# Patient Record
Sex: MALE | Race: WHITE | NOT HISPANIC OR LATINO | Employment: FULL TIME | URBAN - NONMETROPOLITAN AREA
[De-identification: names, ages, dates, MRNs, and addresses within clinical notes are randomized per-mention and may not be internally consistent; named-entity substitution may affect disease eponyms.]

---

## 2018-01-27 ENCOUNTER — HISTORICAL (OUTPATIENT)
Dept: ADMINISTRATIVE | Facility: HOSPITAL | Age: 48
End: 2018-01-27

## 2018-10-29 ENCOUNTER — HISTORICAL (OUTPATIENT)
Dept: ADMINISTRATIVE | Facility: HOSPITAL | Age: 48
End: 2018-10-29

## 2019-04-11 ENCOUNTER — HISTORICAL (OUTPATIENT)
Dept: ADMINISTRATIVE | Facility: HOSPITAL | Age: 49
End: 2019-04-11

## 2019-05-22 ENCOUNTER — HISTORICAL (OUTPATIENT)
Dept: ADMINISTRATIVE | Facility: HOSPITAL | Age: 49
End: 2019-05-22

## 2019-06-25 ENCOUNTER — HISTORICAL (OUTPATIENT)
Dept: ADMINISTRATIVE | Facility: HOSPITAL | Age: 49
End: 2019-06-25

## 2020-06-27 ENCOUNTER — HISTORICAL (OUTPATIENT)
Dept: ADMINISTRATIVE | Facility: HOSPITAL | Age: 50
End: 2020-06-27

## 2021-07-30 ENCOUNTER — HISTORICAL (OUTPATIENT)
Dept: ADMINISTRATIVE | Facility: HOSPITAL | Age: 51
End: 2021-07-30

## 2022-04-10 ENCOUNTER — HISTORICAL (OUTPATIENT)
Dept: ADMINISTRATIVE | Facility: HOSPITAL | Age: 52
End: 2022-04-10

## 2022-04-26 VITALS
WEIGHT: 180.75 LBS | DIASTOLIC BLOOD PRESSURE: 74 MMHG | SYSTOLIC BLOOD PRESSURE: 122 MMHG | BODY MASS INDEX: 27.39 KG/M2 | HEIGHT: 68 IN

## 2022-04-29 NOTE — OP NOTE
DATE OF SURGERY:    04/11/2019    SURGEON:  Michael Newman MD    PREOPERATIVE DIAGNOSIS:  Left medial malleolar ankle fracture.    POSTOPERATIVE DIAGNOSIS:  Left medial malleolar ankle fracture.    PROCEDURE:  Open reduction, internal fixation of left medial malleolar ankle fracture.    ANESTHESIA:  General.    ESTIMATED BLOOD LOSS:  10 cc.    TOURNIQUET TIME:  15 minutes.    IMPLANTS:  Pound 4.0 mm cannulated screws x2.    COMPLICATIONS:  None.    COUNTS:  All counts correct x2 at the end of the case.    INDICATIONS FOR PROCEDURE:  Mr. Mccallum is a 48-year-old male who had a twisting injury to his ankle a little over a week ago, seen and evaluated in the clinic.  The risks and benefits of treatment were discussed.  He had displacement of his fracture and he would benefit from open reduction and internal fixation.    PROCEDURE IN DETAIL:  After informed consent was obtained, the patient was met in the preoperative holding area and the site was marked.  He was taken to the operating room.  He was placed supine on the operating table and general anesthesia was induced.  All bony prominences were well padded.  His preoperative antibiotics were given.  The left lower extremity was prepped and draped in a standard sterile fashion.  A timeout was done to indicate the correct operative limb and procedure.  The limb was exsanguinated; the tourniquet was raised.  A medial incision was made over the ankle and centered over the fracture site.  It was carried down to the level of the fracture site, which was cleaned and irrigated.  He had periosteum enveloped into the fracture site, which was excised sharply with a 15 blade.  It was then clamped with point-of-reduction clamp, anatomically reduced, and 2 pins for the 4.0 mm cannulated screws were placed, confirmed to be in appropriate position on AP, mortise, and lateral views.  The screws were measured and drilled and applied with sequential compression.  The fracture  remained well reduced.  The screws had excellent purchase.  Final fluoroscopic images showed that they were in appropriate position.  A stress examination was performed.  He was found to have no syndesmotic injury.  The wound was irrigated and closed after the tourniquet was released with 2-0 Vicryl and 3-0 nylon.  Xeroform, 4 x 4's, and a well-padded footplate splint with stirrups was applied.  The patient was awakened and extubated, taken to recovery in stable condition.    POSTOPERATIVE PLAN:  He will be discharged home today nonweightbearing, left lower extremity.  Follow up in 2 weeks.  Keep splint clean, dry and intact.        ______________________________  MD ROQUE Petit/DARVIN  DD:  04/11/2019  Time:  08:15AM  DT:  04/11/2019  Time:  08:26AM  Job #:  [id number]

## 2022-05-03 NOTE — HISTORICAL [facility] C[facility]ER
This is a historical note converted from Hayde. Formatting and pictures may have been removed.  Please reference Hayde for original formatting and attached multimedia. Chief Complaint  11 WK F/U ORIF LT MEDIAL MALL ANKLE FX SX 4/11/19 GL 7/10/19. DOING WELL  History of Present Illness  Now just under 3 months status post open reduction internal fixation left medial malleolar ankle fracture. He reports that hes been doing very well. He has some mild swelling that he has been weightbearing as tolerated in regular shoes.?States that he is ready to return to work.?He is off of all narcotic pain medicine.  Review of Systems  Otherwise negative  Physical Exam  Vitals & Measurements  T:?36.7? ?C (Oral)? HR:?80(Peripheral)? RR:?20? BP:?122/74?  HT:?172?cm? WT:?82?kg? BMI:?27.72?  Left lower extremity: Surgical incision is well-healed. Mild swelling noted ankle.?Full active range of motion left ankle compared to the right. No painful or prominent hardware noted. Palpable DP pulse. Sensation light touch intact.  Assessment/Plan  Closed displaced fracture of medial malleolus of left tibia?S82.52XD  Ordered:  Post-Op follow-up visit [id number] PC, Closed displaced fracture of medial malleolus of left tibia, [facility], 06/25/19 13:25:00 CDT  XR Ankle Left Minimum 3 Views, Routine, 06/25/19 13:10:00 CDT, Trauma, None, Patient Bed, Patient Has IV?, S82.52XD, Not Scheduled, 06/25/19 13:10:00 CDT  ?  Orders:  Clinic Follow-up PRN, 06/25/19 13:25:00 CDT, Future Order, [facility]  ?  He is doing great today. His fractures well-healed.?I suggested the use of an elastic ankle brace?to combat swelling. Continue over-the-counter medicines for pain control. He is released to full activities?without restrictions?and will be provided with?a return to work note to begin on 6/27?reflecting this. He will come back to see me on an as-needed basis should any new issues arise in the future. He understands and agrees our plan today and  all questions and concerns were addressed.  ?  Referrals  Clinic Follow-up PRN, 06/25/19 13:25:00 CDT, Future Order, [facility]   Problem List/Past Medical History  Ongoing  Abnormal stress test  Anxiety  Arthritis  Cardiomyopathy  Closed displaced fracture of medial malleolus of left tibia  Depression  Former smoker  Hyperlipidemia  Hypertension  IBS (irritable bowel syndrome)  Left foot pain  Left knee sprain  Migraine  Historical  No qualifying data  Procedure/Surgical History  Open treatment of medial malleolus fracture, includes internal fixation, when performed (04/11/2019)  ORIF Ankle (Left) (04/11/2019)  Reposition Left Tibia with Internal Fixation Device, Open Approach (04/11/2019)  Application of short leg splint (calf to foot) (04/01/2019)  Immobilization of Left Lower Leg using Splint (04/01/2019)  Catheter placement in coronary artery(s) for coronary angiography, including intraprocedural injection(s) for coronary angiography, imaging supervision and interpretation; with left heart catheterization including intraprocedural injection(s) for left osmel (12/07/2016)  Fluoroscopy of Left Heart using Low Osmolar Contrast (12/07/2016)  Fluoroscopy of Multiple Coronary Arteries using Low Osmolar Contrast (12/07/2016)  Measurement of Cardiac Sampling and Pressure, Left Heart, Percutaneous Approach (12/07/2016)  Angiocardiography of left heart structures (09/04/2015)  Catheter placement in coronary artery(s) for coronary angiography, including intraprocedural injection(s) for coronary angiography, imaging supervision and interpretation; with left heart catheterization including intraprocedural injection(s) for left osmel (09/04/2015)  Coronary arteriography using a single catheter (09/04/2015)  Left heart cardiac catheterization (09/04/2015)  Left Heart Cath w/COR Angio Ventriculography (None) (09/04/2015)  ACL - Reconstruction of anterior cruciate ligament  Carpal tunnel release  Hand  left neck dissection-  cyst  rotator cuff repair on right shoulder   Medications  acetaminophen-oxycodone 325 mg-10 mg oral tablet, 1 tab(s), Oral, QID,? ?Not Taking per Prescriber: Last Dose Date/Time Unknown  acetaminophen-oxycodone 325 mg-5 mg oral tablet, Oral  aspirin 81 mg oral tablet, 81 mg= 1 tab(s), Oral, Daily  atorvastatin 80 mg oral tablet, 80 mg= 1 tab(s), Oral, Daily  Coreg 25 mg oral tablet, 25 mg= 1 tab(s), Oral, BID  lisinopril 20 mg oral tablet, 20 mg= 1 tab(s), Oral, Daily  multivitamin with minerals (Adult Tab), 1 tab(s), Oral, Daily  Allergies  Lortab?(itching)  clindamycin?(Rash)  Social History  Abuse/Neglect  No, No, Yes, 06/25/2019  Alcohol - Denies Alcohol Use, 09/03/2015  Substance Use - Low Risk, 09/03/2015  Past, 09/03/2015  Tobacco - Medium Risk, 09/03/2015  Former smoker, quit more than 30 days ago, No, 06/25/2019  Family History  Family history is negative  Health Maintenance  Health Maintenance  ???Pending?(in the next year)  ??? ??OverDue  ??? ? ? ?Diabetes Screening due??and every?  ??? ? ? ?Aspirin Therapy for CVD Prevention due??12/06/17??and every 1??year(s)  ??? ??Due?  ??? ? ? ?ADL Screening due??06/25/19??and every 1??year(s)  ??? ? ? ?Tetanus Vaccine due??06/25/19??and every 10??year(s)  ??? ??Due In Future?  ??? ? ? ?Alcohol Misuse Screening not due until??01/01/20??and every 1??year(s)  ??? ? ? ?Obesity Screening not due until??01/01/20??and every 1??year(s)  ??? ? ? ?Hypertension Management-BMP not due until??02/20/20??and every 1??year(s)  ??? ? ? ?Blood Pressure Screening not due until??06/24/20??and every 1??year(s)  ??? ? ? ?Body Mass Index Check not due until??06/24/20??and every 1??year(s)  ??? ? ? ?Hypertension Management-Blood Pressure not due until??06/24/20??and every 1??year(s)  ???Satisfied?(in the past 1 year)  ??? ??Satisfied?  ??? ? ? ?Alcohol Misuse Screening on??06/25/19.??Satisfied by Virgen Lopez  ??? ? ? ?Blood Pressure Screening on??06/25/19.??Satisfied by John  Virgen COYNE  ??? ? ? ?Body Mass Index Check on??06/25/19.??Satisfied by Virgen Lopez  ??? ? ? ?Depression Screening on??04/24/19.??Satisfied by Gabrielle Perez.  ??? ? ? ?Diabetes Screening on??02/19/19.??Satisfied by Joe Griffin.  ??? ? ? ?Hypertension Management-Blood Pressure on??06/25/19.??Satisfied by Virgen Lopez  ??? ? ? ?Obesity Screening on??06/25/19.??Satisfied by Virgen Lopez  ?  Diagnostic Results  Left ankle 3 views:?Hardware intact. Alignment maintained. Fracture well-healed.

## 2022-05-03 NOTE — HISTORICAL [facility]
This is a historical note converted from Hayde. Formatting and pictures may have been removed.  Please reference [facility] for original formatting and attached multimedia. Chief Complaint  6 WEEK F/U ORIF ?LEFT MEDIAL MALLEOLUS ANKLE FX, IN BOOT, NWB  History of Present Illness  Patient is here for 6 week follow up status post ORIF left medial mal. He has been compliant with NWB to LLE in CAM boot. Here today for x-rays and evaluation. Doing well overall.  Review of Systems  Constitutional: negative except as stated in HPI  Eye: negative except as stated in HPI  ENMT: negative except as stated in HPI  Respiratory: negative except as stated in HPI  Cardiovascular: negative except as stated in HPI  Gastrointestinal: negative except as stated in HPI  Genitourinary: negative except as stated in HPI  Hema/Lymph: negative except as stated in HPI  Endocrine: negative except as stated in HPI  Immunologic: negative except as stated in HPI  Musculoskeletal: negative except as stated in HPI  Integumentary: negative except as stated in HPI  Neurologic: negative except as stated in HPI  ?   All Other ROS_ negative except as stated in HPI?  ?  ?  Physical Exam  General-Alert, oriented, no fever, chills  Musculoskeletal- LLE: surgical incision well healed. Good ROM of ankle, NVID, BCR to digits, moves digits well, DP+2  Neurologic-Alert and oriented x4, cooperative  Dermatologic-Skin warm, pink, dry.?  ?  ?  Assessment/Plan  Closed displaced fracture of medial malleolus of left tibia?S82.52XD  Ordered:  Clinic Follow up, *Est. 06/19/19 3:00:00 CDT, Order for future visit, Closed displaced fracture of medial malleolus of left tibia, [facility]  Post-Op follow-up visit [id number] , Closed displaced fracture of medial malleolus of left tibia, [facility], 05/22/19 13:22:00 CDT  XR Ankle Left Minimum 3 Views, Routine, *Est. 06/19/19 3:00:00 CDT, Trauma, None, Patient Bed, Patient Has IV?, Rad Type, Order for future visit,  Closed displaced fracture of medial malleolus of left tibia, Not Scheduled, *Est. 06/19/19 3:00:00 CDT  ?  Patient doing well overall. He may advance to WBAT to LLE in CAM boot. He can begin to wean from crutches and begin to wean from CAM boot into regular shoes.?Continue to work on ROM, strengthening, and stretching to LLE.?He will return in 1 month for repeat x-rays and evaluation. He verbalizes understanding of plan of care, all questions and concerns addressed.  Referrals  Clinic Follow up, *Est. 06/19/19 3:00:00 CDT, Order for future visit, Closed displaced fracture of medial malleolus of left tibia, [facility]   Problem List/Past Medical History  Ongoing  Abnormal stress test  Anxiety  Arthritis  Cardiomyopathy  Closed displaced fracture of medial malleolus of left tibia  Depression  Former smoker  Hyperlipidemia  Hypertension  IBS (irritable bowel syndrome)  Left foot pain  Left knee sprain  Migraine  Historical  No qualifying data  Procedure/Surgical History  Open treatment of medial malleolus fracture, includes internal fixation, when performed (04/11/2019)  ORIF Ankle (Left) (04/11/2019)  Reposition Left Tibia with Internal Fixation Device, Open Approach (04/11/2019)  Application of short leg splint (calf to foot) (04/01/2019)  Immobilization of Left Lower Leg using Splint (04/01/2019)  Catheter placement in coronary artery(s) for coronary angiography, including intraprocedural injection(s) for coronary angiography, imaging supervision and interpretation; with left heart catheterization including intraprocedural injection(s) for left osmel (12/07/2016)  Fluoroscopy of Left Heart using Low Osmolar Contrast (12/07/2016)  Fluoroscopy of Multiple Coronary Arteries using Low Osmolar Contrast (12/07/2016)  Measurement of Cardiac Sampling and Pressure, Left Heart, Percutaneous Approach (12/07/2016)  Angiocardiography of left heart structures (09/04/2015)  Catheter placement in coronary artery(s) for coronary  angiography, including intraprocedural injection(s) for coronary angiography, imaging supervision and interpretation; with left heart catheterization including intraprocedural injection(s) for left osmel (09/04/2015)  Coronary arteriography using a single catheter (09/04/2015)  Left heart cardiac catheterization (09/04/2015)  Left Heart Cath w/COR Angio Ventriculography (None) (09/04/2015)  ACL - Reconstruction of anterior cruciate ligament  Carpal tunnel release  Hand  left neck dissection- cyst  rotator cuff repair on right shoulder   Medications  acetaminophen-oxycodone 325 mg-10 mg oral tablet, 1 tab(s), Oral, QID  aspirin 81 mg oral tablet, 81 mg= 1 tab(s), Oral, Daily  atorvastatin 80 mg oral tablet, 80 mg= 1 tab(s), Oral, Daily  Coreg 25 mg oral tablet, 25 mg= 1 tab(s), Oral, BID  lisinopril 20 mg oral tablet, 20 mg= 1 tab(s), Oral, Daily  multivitamin with minerals (Adult Tab), 1 tab(s), Oral, Daily  Allergies  Lortab?(itching)  clindamycin?(Rash)  Social History  Alcohol - Denies Alcohol Use, 09/03/2015  Substance Abuse - Low Risk, 09/03/2015  Past, 09/03/2015  Tobacco - Medium Risk, 09/03/2015  Former smoker, quit more than 30 days ago, N/A, 04/24/2019  Former smoker, quit more than 30 days ago, No, 04/11/2019  Former smoker, quit more than 30 days ago, N/A, 04/08/2019  Family History  Family history is negative  Health Maintenance  Health Maintenance  ???Pending?(in the next year)  ??? ??OverDue  ??? ? ? ?Diabetes Screening due??and every?  ??? ? ? ?Aspirin Therapy for CVD Prevention due??12/06/17??and every 1??year(s)  ??? ? ? ?Alcohol Misuse Screening due??01/01/19??and every 1??year(s)  ??? ??Due?  ??? ? ? ?ADL Screening due??05/22/19??and every 1??year(s)  ??? ? ? ?Tetanus Vaccine due??05/22/19??and every 10??year(s)  ??? ??Due In Future?  ??? ? ? ?Obesity Screening not due until??01/01/20??and every 1??year(s)  ??? ? ? ?Hypertension Management-BMP not due until??02/20/20??and every 1??year(s)  ??? ? ?  ?Blood Pressure Screening not due until??04/23/20??and every 1??year(s)  ??? ? ? ?Body Mass Index Check not due until??04/23/20??and every 1??year(s)  ??? ? ? ?Hypertension Management-Blood Pressure not due until??04/23/20??and every 1??year(s)  ???Satisfied?(in the past 1 year)  ??? ??Satisfied?  ??? ? ? ?Blood Pressure Screening on??04/24/19.??Satisfied by Gabrielle Perez.  ??? ? ? ?Body Mass Index Check on??04/24/19.??Satisfied by Gabrielle Perez  ??? ? ? ?Depression Screening on??04/24/19.??Satisfied by Gabrielle Perez  ??? ? ? ?Diabetes Screening on??02/19/19.??Satisfied by Joe Griffin  ??? ? ? ?Hypertension Management-Blood Pressure on??04/24/19.??Satisfied by Gabrielle Perez  ??? ? ? ?Obesity Screening on??04/24/19.??Satisfied by Gabrielle Perez  ?  ?  Diagnostic Results  Left ankle Xray shows acceptable alignment, intact hardware, evidence of interval consolidation noted?  ?  ?      Patient evaluated and discussed with Bradford Carroll NP. I agree with his assessment and plan of care with any exceptions or additions noted.?

## 2023-06-27 ENCOUNTER — OFFICE VISIT (OUTPATIENT)
Dept: FAMILY MEDICINE | Facility: CLINIC | Age: 53
End: 2023-06-27
Payer: MEDICAID

## 2023-06-27 ENCOUNTER — TELEPHONE (OUTPATIENT)
Dept: FAMILY MEDICINE | Facility: CLINIC | Age: 53
End: 2023-06-27

## 2023-06-27 VITALS
BODY MASS INDEX: 29.88 KG/M2 | WEIGHT: 190.38 LBS | DIASTOLIC BLOOD PRESSURE: 84 MMHG | OXYGEN SATURATION: 97 % | HEART RATE: 64 BPM | SYSTOLIC BLOOD PRESSURE: 140 MMHG | HEIGHT: 67 IN | TEMPERATURE: 97 F

## 2023-06-27 DIAGNOSIS — I10 PRIMARY HYPERTENSION: ICD-10-CM

## 2023-06-27 DIAGNOSIS — Z12.11 SCREEN FOR COLON CANCER: ICD-10-CM

## 2023-06-27 DIAGNOSIS — H10.10 ALLERGIC CONJUNCTIVITIS, UNSPECIFIED LATERALITY: ICD-10-CM

## 2023-06-27 DIAGNOSIS — Z12.5 SCREENING FOR MALIGNANT NEOPLASM OF PROSTATE: ICD-10-CM

## 2023-06-27 DIAGNOSIS — J30.9 ALLERGIC RHINITIS, UNSPECIFIED SEASONALITY, UNSPECIFIED TRIGGER: ICD-10-CM

## 2023-06-27 DIAGNOSIS — Z13.1 SCREENING FOR DIABETES MELLITUS: ICD-10-CM

## 2023-06-27 DIAGNOSIS — E78.5 HYPERLIPIDEMIA, UNSPECIFIED HYPERLIPIDEMIA TYPE: ICD-10-CM

## 2023-06-27 DIAGNOSIS — Z76.89 ENCOUNTER TO ESTABLISH CARE: Primary | ICD-10-CM

## 2023-06-27 DIAGNOSIS — G47.33 OBSTRUCTIVE SLEEP APNEA: ICD-10-CM

## 2023-06-27 DIAGNOSIS — M19.90 ARTHRITIS: ICD-10-CM

## 2023-06-27 PROBLEM — I25.10 CORONARY ARTERIOSCLEROSIS: Status: ACTIVE | Noted: 2023-06-27

## 2023-06-27 PROBLEM — K58.9 IRRITABLE BOWEL SYNDROME: Status: ACTIVE | Noted: 2023-06-27

## 2023-06-27 PROBLEM — S82.53XA CLOSED FRACTURE OF MEDIAL MALLEOLUS: Status: ACTIVE | Noted: 2023-06-27

## 2023-06-27 PROBLEM — R09.89 CAROTID BRUIT: Status: ACTIVE | Noted: 2023-06-27

## 2023-06-27 PROBLEM — I42.9 CARDIOMYOPATHY: Status: ACTIVE | Noted: 2023-06-27

## 2023-06-27 LAB
ALBUMIN SERPL-MCNC: 4.6 G/DL (ref 3.4–5)
ALBUMIN/GLOB SERPL: 2.1 RATIO
ALP SERPL-CCNC: 37 UNIT/L (ref 50–144)
ALT SERPL-CCNC: 27 UNIT/L (ref 1–45)
ANION GAP SERPL CALC-SCNC: 6 MEQ/L (ref 2–13)
AST SERPL-CCNC: 26 UNIT/L (ref 17–59)
BASOPHILS # BLD AUTO: 0.04 X10(3)/MCL (ref 0.01–0.08)
BASOPHILS NFR BLD AUTO: 0.4 % (ref 0.1–1.2)
BILIRUBIN DIRECT+TOT PNL SERPL-MCNC: 0.4 MG/DL (ref 0–1)
BUN SERPL-MCNC: 19 MG/DL (ref 7–20)
CALCIUM SERPL-MCNC: 9.3 MG/DL (ref 8.4–10.2)
CHLORIDE SERPL-SCNC: 111 MMOL/L (ref 98–110)
CHOLEST SERPL-MCNC: 128 MG/DL (ref 0–200)
CO2 SERPL-SCNC: 24 MMOL/L (ref 21–32)
CREAT SERPL-MCNC: 1.04 MG/DL (ref 0.66–1.25)
CREAT/UREA NIT SERPL: 18 (ref 12–20)
EOSINOPHIL # BLD AUTO: 0.39 X10(3)/MCL (ref 0.04–0.54)
EOSINOPHIL NFR BLD AUTO: 4.4 % (ref 0.7–7)
ERYTHROCYTE [DISTWIDTH] IN BLOOD BY AUTOMATED COUNT: 14 %
EST. AVERAGE GLUCOSE BLD GHB EST-MCNC: 122.6 MG/DL (ref 70–115)
GFR SERPLBLD CREATININE-BSD FMLA CKD-EPI: 86 MLS/MIN/1.73/M2
GLOBULIN SER-MCNC: 2.2 GM/DL (ref 2–3.9)
GLUCOSE SERPL-MCNC: 100 MG/DL (ref 70–115)
HBA1C MFR BLD: 5.9 % (ref 4–6)
HCT VFR BLD AUTO: 34.6 % (ref 36–52)
HDLC SERPL-MCNC: 43 MG/DL (ref 40–60)
HGB BLD-MCNC: 12 G/DL (ref 13–18)
IMM GRANULOCYTES # BLD AUTO: 0.03 X10(3)/MCL (ref 0–0.03)
IMM GRANULOCYTES NFR BLD AUTO: 0.3 % (ref 0–0.5)
LDLC SERPL DIRECT ASSAY-SCNC: 57.2 MG/DL (ref 30–100)
LYMPHOCYTES # BLD AUTO: 2.08 X10(3)/MCL (ref 1.32–3.57)
LYMPHOCYTES NFR BLD AUTO: 23.2 % (ref 20–55)
MCH RBC QN AUTO: 32.1 PG (ref 27–34)
MCHC RBC AUTO-ENTMCNC: 34.7 G/DL (ref 31–37)
MCV RBC AUTO: 92.5 FL (ref 79–99)
MONOCYTES # BLD AUTO: 0.8 X10(3)/MCL (ref 0.3–0.82)
MONOCYTES NFR BLD AUTO: 8.9 % (ref 4.7–12.5)
NEUTROPHILS # BLD AUTO: 5.62 X10(3)/MCL (ref 1.78–5.38)
NEUTROPHILS NFR BLD AUTO: 62.8 % (ref 37–73)
NRBC BLD AUTO-RTO: 0 %
PLATELET # BLD AUTO: 320 X10(3)/MCL (ref 140–371)
PMV BLD AUTO: 9.7 FL (ref 9.4–12.4)
POTASSIUM SERPL-SCNC: 4.6 MMOL/L (ref 3.5–5.1)
PROT SERPL-MCNC: 6.8 GM/DL (ref 6.3–8.2)
PSA SERPL-MCNC: 0.3 NG/ML
RBC # BLD AUTO: 3.74 X10(6)/MCL (ref 4–6)
SODIUM SERPL-SCNC: 141 MMOL/L (ref 135–145)
TRIGL SERPL-MCNC: 132 MG/DL (ref 30–200)
TSH SERPL-ACNC: 0.86 UIU/ML (ref 0.36–3.74)
WBC # SPEC AUTO: 8.96 X10(3)/MCL (ref 4–11.5)

## 2023-06-27 PROCEDURE — 3008F BODY MASS INDEX DOCD: CPT | Mod: CPTII,,, | Performed by: NURSE PRACTITIONER

## 2023-06-27 PROCEDURE — 80053 COMPREHEN METABOLIC PANEL: CPT | Performed by: NURSE PRACTITIONER

## 2023-06-27 PROCEDURE — 1159F MED LIST DOCD IN RCRD: CPT | Mod: CPTII,,, | Performed by: NURSE PRACTITIONER

## 2023-06-27 PROCEDURE — 80061 LIPID PANEL: CPT | Performed by: NURSE PRACTITIONER

## 2023-06-27 PROCEDURE — 1160F PR REVIEW ALL MEDS BY PRESCRIBER/CLIN PHARMACIST DOCUMENTED: ICD-10-PCS | Mod: CPTII,,, | Performed by: NURSE PRACTITIONER

## 2023-06-27 PROCEDURE — 3008F PR BODY MASS INDEX (BMI) DOCUMENTED: ICD-10-PCS | Mod: CPTII,,, | Performed by: NURSE PRACTITIONER

## 2023-06-27 PROCEDURE — 3077F SYST BP >= 140 MM HG: CPT | Mod: CPTII,,, | Performed by: NURSE PRACTITIONER

## 2023-06-27 PROCEDURE — 84443 ASSAY THYROID STIM HORMONE: CPT | Performed by: NURSE PRACTITIONER

## 2023-06-27 PROCEDURE — 1159F PR MEDICATION LIST DOCUMENTED IN MEDICAL RECORD: ICD-10-PCS | Mod: CPTII,,, | Performed by: NURSE PRACTITIONER

## 2023-06-27 PROCEDURE — 3079F PR MOST RECENT DIASTOLIC BLOOD PRESSURE 80-89 MM HG: ICD-10-PCS | Mod: CPTII,,, | Performed by: NURSE PRACTITIONER

## 2023-06-27 PROCEDURE — 1160F RVW MEDS BY RX/DR IN RCRD: CPT | Mod: CPTII,,, | Performed by: NURSE PRACTITIONER

## 2023-06-27 PROCEDURE — 99204 OFFICE O/P NEW MOD 45 MIN: CPT | Mod: ,,, | Performed by: NURSE PRACTITIONER

## 2023-06-27 PROCEDURE — 4010F PR ACE/ARB THEARPY RXD/TAKEN: ICD-10-PCS | Mod: CPTII,,, | Performed by: NURSE PRACTITIONER

## 2023-06-27 PROCEDURE — 4010F ACE/ARB THERAPY RXD/TAKEN: CPT | Mod: CPTII,,, | Performed by: NURSE PRACTITIONER

## 2023-06-27 PROCEDURE — 3079F DIAST BP 80-89 MM HG: CPT | Mod: CPTII,,, | Performed by: NURSE PRACTITIONER

## 2023-06-27 PROCEDURE — 84153 ASSAY OF PSA TOTAL: CPT | Performed by: NURSE PRACTITIONER

## 2023-06-27 PROCEDURE — 3077F PR MOST RECENT SYSTOLIC BLOOD PRESSURE >= 140 MM HG: ICD-10-PCS | Mod: CPTII,,, | Performed by: NURSE PRACTITIONER

## 2023-06-27 PROCEDURE — 83036 HEMOGLOBIN GLYCOSYLATED A1C: CPT | Performed by: NURSE PRACTITIONER

## 2023-06-27 PROCEDURE — 85025 COMPLETE CBC W/AUTO DIFF WBC: CPT | Performed by: NURSE PRACTITIONER

## 2023-06-27 PROCEDURE — 99204 PR OFFICE/OUTPT VISIT, NEW, LEVL IV, 45-59 MIN: ICD-10-PCS | Mod: ,,, | Performed by: NURSE PRACTITIONER

## 2023-06-27 RX ORDER — FLUTICASONE PROPIONATE 50 MCG
2 SPRAY, SUSPENSION (ML) NASAL DAILY
Qty: 15.8 ML | Refills: 3 | Status: SHIPPED | OUTPATIENT
Start: 2023-06-27 | End: 2023-07-27

## 2023-06-27 RX ORDER — PREDNISONE 10 MG/1
10 TABLET ORAL 2 TIMES DAILY
COMMUNITY
Start: 2023-04-05 | End: 2023-06-27

## 2023-06-27 RX ORDER — OLOPATADINE HYDROCHLORIDE 1 MG/ML
1 SOLUTION/ DROPS OPHTHALMIC 2 TIMES DAILY
Qty: 5 ML | Refills: 0 | Status: SHIPPED | OUTPATIENT
Start: 2023-06-27 | End: 2023-08-24 | Stop reason: SDUPTHER

## 2023-06-27 RX ORDER — DICLOFENAC SODIUM 75 MG/1
75 TABLET, DELAYED RELEASE ORAL 2 TIMES DAILY PRN
COMMUNITY
Start: 2023-05-13 | End: 2023-06-27

## 2023-06-27 RX ORDER — IBUPROFEN 800 MG/1
800 TABLET ORAL EVERY 6 HOURS PRN
COMMUNITY
Start: 2023-05-31 | End: 2023-07-11

## 2023-06-27 RX ORDER — ROSUVASTATIN CALCIUM 40 MG/1
40 TABLET, COATED ORAL NIGHTLY
COMMUNITY
End: 2023-06-27

## 2023-06-27 RX ORDER — ASPIRIN 81 MG/1
81 TABLET ORAL DAILY
COMMUNITY

## 2023-06-27 RX ORDER — ATORVASTATIN CALCIUM 40 MG/1
40 TABLET, FILM COATED ORAL
COMMUNITY
Start: 2023-03-03 | End: 2023-06-27 | Stop reason: SDUPTHER

## 2023-06-27 RX ORDER — LISINOPRIL 40 MG/1
40 TABLET ORAL
COMMUNITY
Start: 2023-06-18 | End: 2023-12-28 | Stop reason: SDUPTHER

## 2023-06-27 RX ORDER — CARVEDILOL 25 MG/1
37.5 TABLET, FILM COATED ORAL 2 TIMES DAILY
COMMUNITY
Start: 2023-05-02

## 2023-06-27 RX ORDER — HYDROCHLOROTHIAZIDE 12.5 MG/1
12.5 TABLET ORAL EVERY MORNING
COMMUNITY
Start: 2023-05-15 | End: 2023-06-27

## 2023-06-27 RX ORDER — BUPROPION HYDROCHLORIDE 150 MG/1
150 TABLET, EXTENDED RELEASE ORAL 2 TIMES DAILY
COMMUNITY
Start: 2022-12-31 | End: 2023-06-27

## 2023-06-27 RX ORDER — PROMETHAZINE HYDROCHLORIDE AND DEXTROMETHORPHAN HYDROBROMIDE 6.25; 15 MG/5ML; MG/5ML
5 SYRUP ORAL
COMMUNITY
Start: 2023-04-05 | End: 2023-06-27

## 2023-06-27 RX ORDER — ATORVASTATIN CALCIUM 40 MG/1
40 TABLET, FILM COATED ORAL NIGHTLY
Qty: 90 TABLET | Refills: 3 | Status: SHIPPED | OUTPATIENT
Start: 2023-06-27 | End: 2023-08-24 | Stop reason: SDUPTHER

## 2023-06-27 NOTE — PROGRESS NOTES
"Patient ID: Mihir Mccallum is a 53 y.o. male.    Chief Complaint: Establish Care (Has CAD, HTN, high cholesterol, sleep aponia)                     History of Present Illness:  The patient is 53 y.o. White male for evaluation and management with a chief complaint of Establish Care (Has CAD, HTN, high cholesterol, sleep aponia) .  Has not had labs by previous PCP in about 2 years.  Established with Dr. Fernández for CAD that is nonobstructive.  Diagnosed with CAD a few years ago after needing cardiac clearance for surgery.  He does report that his blood pressure has been a little elevated over the past week because he ran out of his carvedilol and the pharmacy says it is too early to refill it.  For the most part, his home readings were averaging 120/75.  Unable to recall the date of his most recent angiogram but states "everything was clear." Needs refill on atorvastatin. Recently diagnosed with obstructive sleep apnea and awaiting Medicaid approval for CPAP.    Requesting prescription for Flonase for allergic rhinitis.  He is also complaining of itchy, watery eyes.  Often tear during the day.  Works outdoors as a gonzales.      Followed by Dr. Ferreira for bilateral shoulder pain.  Takes ibuprofen as needed.      Currently smokes 1 pack of cigarettes per day, daily smoker for 44 years.  Quit drinking alcohol about 12 years ago.  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      Review of Systems   Constitutional:  Negative for activity change, appetite change, fatigue and fever.   HENT:  Negative for ear pain, hearing loss, tinnitus and trouble swallowing.    Eyes:  Positive for redness and itching. Negative for pain and visual disturbance.   Respiratory:  Negative for cough, chest tightness, shortness of breath and wheezing.    Cardiovascular:  Negative for chest pain, palpitations, leg swelling and claudication.   Gastrointestinal:  Negative for abdominal pain, blood in stool, change in bowel habit, " "constipation, diarrhea, nausea, vomiting and change in bowel habit.   Endocrine: Negative for cold intolerance, heat intolerance, polydipsia, polyphagia and polyuria.   Genitourinary:  Negative for dysuria, frequency and hematuria.   Musculoskeletal:  Positive for arthralgias, neck pain and joint deformity. Negative for gait problem and joint swelling.   Integumentary:  Negative for rash, wound and mole/lesion.   Allergic/Immunologic: Negative for environmental allergies.   Neurological:  Negative for dizziness, seizures, weakness, headaches and memory loss.   Hematological:  Negative for adenopathy. Does not bruise/bleed easily.   Psychiatric/Behavioral:  Negative for confusion, sleep disturbance and suicidal ideas. The patient is not nervous/anxious.        Past Medical History:  has a past medical history of CAD (coronary artery disease), Hyperlipidemia, Hypertension, and Sleep apnea, unspecified.    Current Outpatient Medications   Medication Instructions    aspirin (ECOTRIN) 81 mg, Oral, Daily    atorvastatin (LIPITOR) 40 mg, Oral    COREG 37.5 mg, Oral, 2 times daily    fluticasone propionate (FLONASE) 100 mcg, Each Nostril, Daily    ibuprofen (ADVIL,MOTRIN) 800 mg, Oral, Every 6 hours PRN    lisinopriL (PRINIVIL,ZESTRIL) 40 mg, Oral    olopatadine (PATANOL) 0.1 % ophthalmic solution 1 drop, Both Eyes, 2 times daily       Patient is allergic to clindamycin and hydrocodone-acetaminophen.       Visit Vitals  BP (!) 140/84 (BP Location: Left arm)   Pulse 64   Temp 97.2 °F (36.2 °C) (Temporal)   Ht 5' 7" (1.702 m)   Wt 86.4 kg (190 lb 6.4 oz)   SpO2 97%   BMI 29.82 kg/m²         Physical Exam  Vitals reviewed.   Constitutional:       Appearance: Normal appearance.   HENT:      Right Ear: Tympanic membrane, ear canal and external ear normal.      Left Ear: Tympanic membrane, ear canal and external ear normal.      Mouth/Throat:      Mouth: Mucous membranes are moist.      Pharynx: No oropharyngeal exudate or " posterior oropharyngeal erythema.   Eyes:      General: No scleral icterus.        Right eye: No discharge.         Left eye: No discharge.      Extraocular Movements: Extraocular movements intact.      Pupils: Pupils are equal, round, and reactive to light.      Comments: Mild erythema    Cardiovascular:      Rate and Rhythm: Normal rate and regular rhythm.      Pulses: Normal pulses.      Heart sounds: Normal heart sounds.   Pulmonary:      Effort: Pulmonary effort is normal. No respiratory distress.      Breath sounds: Normal breath sounds.   Abdominal:      General: Abdomen is flat. Bowel sounds are normal.      Palpations: Abdomen is soft.      Tenderness: There is no abdominal tenderness.   Musculoskeletal:      Cervical back: Normal range of motion and neck supple. No tenderness.      Right lower leg: No edema.      Left lower leg: No edema.   Lymphadenopathy:      Cervical: No cervical adenopathy.   Skin:     General: Skin is warm and dry.   Neurological:      Mental Status: He is alert and oriented to person, place, and time. Mental status is at baseline.   Psychiatric:         Mood and Affect: Mood normal.         Thought Content: Thought content normal.         Judgment: Judgment normal.         Assessment/Plan:    ICD-10-CM ICD-9-CM   1. Encounter to establish care  Z76.89 V65.8   2. Hyperlipidemia, unspecified hyperlipidemia type  E78.5 272.4   3. Primary hypertension  I10 401.9   4. Arthritis  M19.90 716.90   5. Obstructive sleep apnea  G47.33 327.23   6. Allergic conjunctivitis, unspecified laterality  H10.10 372.14   7. Allergic rhinitis, unspecified seasonality, unspecified trigger  J30.9 477.9   8. Screening for malignant neoplasm of prostate  Z12.5 V76.44   9. Screening for diabetes mellitus  Z13.1 V77.1   10. Screen for colon cancer  Z12.11 V76.51       1. Encounter to establish care  Obtain records from Cardiology    2. Hyperlipidemia, unspecified hyperlipidemia type  Obtain CMP and FLP today    Refill atorvastatin 40 mg nightly  - Comprehensive Metabolic Panel; Future  - Lipid Panel; Future  - Comprehensive Metabolic Panel  - Lipid Panel    3. Primary hypertension  A little elevated today but has been out of carvedilol for the past week   Continue current medications   Blood pressure log to discuss it next visit  - CBC Auto Differential; Future  - TSH; Future  - CBC Auto Differential  - TSH    4. Arthritis  Continue ibuprofen as needed    5. Obstructive sleep apnea  Awaiting CPAP    6. Allergic conjunctivitis, unspecified laterality  - olopatadine (PATANOL) 0.1 % ophthalmic solution; Place 1 drop into both eyes 2 (two) times daily.  Dispense: 5 mL; Refill: 0    7. Allergic rhinitis, unspecified seasonality, unspecified trigger  - fluticasone propionate (FLONASE) 50 mcg/actuation nasal spray; 2 sprays (100 mcg total) by Each Nostril route once daily.  Dispense: 15.8 mL; Refill: 3    8. Screening for malignant neoplasm of prostate  - PSA, Screening    9. Screening for diabetes mellitus  - Hemoglobin A1C    10. Screen for colon cancer  - Ambulatory referral/consult to General Surgery; Future         Follow up in about 2 weeks (around 7/11/2023) for lab f/u. or sooner as needed.    Future Appointments   Date Time Provider Department Center   7/11/2023 11:15 AM NICOLE Silva FNP

## 2023-07-11 ENCOUNTER — OFFICE VISIT (OUTPATIENT)
Dept: FAMILY MEDICINE | Facility: CLINIC | Age: 53
End: 2023-07-11
Payer: MEDICAID

## 2023-07-11 VITALS
BODY MASS INDEX: 29.95 KG/M2 | TEMPERATURE: 97 F | OXYGEN SATURATION: 99 % | DIASTOLIC BLOOD PRESSURE: 60 MMHG | HEART RATE: 72 BPM | SYSTOLIC BLOOD PRESSURE: 118 MMHG | WEIGHT: 190.81 LBS | HEIGHT: 67 IN

## 2023-07-11 DIAGNOSIS — D64.9 ANEMIA, UNSPECIFIED TYPE: ICD-10-CM

## 2023-07-11 DIAGNOSIS — M13.0 POLYARTHRITIS: ICD-10-CM

## 2023-07-11 DIAGNOSIS — F17.210 NICOTINE DEPENDENCE, CIGARETTES, UNCOMPLICATED: ICD-10-CM

## 2023-07-11 DIAGNOSIS — I65.23 CAROTID STENOSIS, BILATERAL: ICD-10-CM

## 2023-07-11 DIAGNOSIS — Z00.00 WELLNESS EXAMINATION: Primary | ICD-10-CM

## 2023-07-11 DIAGNOSIS — G47.33 OBSTRUCTIVE SLEEP APNEA: ICD-10-CM

## 2023-07-11 DIAGNOSIS — R73.03 PREDIABETES: ICD-10-CM

## 2023-07-11 DIAGNOSIS — I42.7 CARDIOMYOPATHY SECONDARY TO DRUG: ICD-10-CM

## 2023-07-11 DIAGNOSIS — F19.11 HISTORY OF SUBSTANCE ABUSE: ICD-10-CM

## 2023-07-11 DIAGNOSIS — Z12.2 SCREENING FOR LUNG CANCER: ICD-10-CM

## 2023-07-11 DIAGNOSIS — E78.5 HYPERLIPIDEMIA, UNSPECIFIED HYPERLIPIDEMIA TYPE: ICD-10-CM

## 2023-07-11 LAB
FERRITIN SERPL-MCNC: 169 NG/ML (ref 17.9–464)
FOLATE SERPL-MCNC: >20 NG/ML (ref 2.8–20)
VIT B12 SERPL-MCNC: 456 PG/ML (ref 211–946)

## 2023-07-11 PROCEDURE — 86803 HEPATITIS C AB TEST: CPT | Performed by: NURSE PRACTITIONER

## 2023-07-11 PROCEDURE — 1159F MED LIST DOCD IN RCRD: CPT | Mod: CPTII,,, | Performed by: NURSE PRACTITIONER

## 2023-07-11 PROCEDURE — 83550 IRON BINDING TEST: CPT | Performed by: NURSE PRACTITIONER

## 2023-07-11 PROCEDURE — 82607 VITAMIN B-12: CPT | Performed by: NURSE PRACTITIONER

## 2023-07-11 PROCEDURE — 1159F PR MEDICATION LIST DOCUMENTED IN MEDICAL RECORD: ICD-10-PCS | Mod: CPTII,,, | Performed by: NURSE PRACTITIONER

## 2023-07-11 PROCEDURE — 1160F PR REVIEW ALL MEDS BY PRESCRIBER/CLIN PHARMACIST DOCUMENTED: ICD-10-PCS | Mod: CPTII,,, | Performed by: NURSE PRACTITIONER

## 2023-07-11 PROCEDURE — 87389 HIV-1 AG W/HIV-1&-2 AB AG IA: CPT | Performed by: NURSE PRACTITIONER

## 2023-07-11 PROCEDURE — 4010F PR ACE/ARB THEARPY RXD/TAKEN: ICD-10-PCS | Mod: CPTII,,, | Performed by: NURSE PRACTITIONER

## 2023-07-11 PROCEDURE — 3074F PR MOST RECENT SYSTOLIC BLOOD PRESSURE < 130 MM HG: ICD-10-PCS | Mod: CPTII,,, | Performed by: NURSE PRACTITIONER

## 2023-07-11 PROCEDURE — 1160F RVW MEDS BY RX/DR IN RCRD: CPT | Mod: CPTII,,, | Performed by: NURSE PRACTITIONER

## 2023-07-11 PROCEDURE — 3074F SYST BP LT 130 MM HG: CPT | Mod: CPTII,,, | Performed by: NURSE PRACTITIONER

## 2023-07-11 PROCEDURE — 82746 ASSAY OF FOLIC ACID SERUM: CPT | Performed by: NURSE PRACTITIONER

## 2023-07-11 PROCEDURE — 99396 PR PREVENTIVE VISIT,EST,40-64: ICD-10-PCS | Mod: ,,, | Performed by: NURSE PRACTITIONER

## 2023-07-11 PROCEDURE — 3008F BODY MASS INDEX DOCD: CPT | Mod: CPTII,,, | Performed by: NURSE PRACTITIONER

## 2023-07-11 PROCEDURE — 3078F DIAST BP <80 MM HG: CPT | Mod: CPTII,,, | Performed by: NURSE PRACTITIONER

## 2023-07-11 PROCEDURE — 3078F PR MOST RECENT DIASTOLIC BLOOD PRESSURE < 80 MM HG: ICD-10-PCS | Mod: CPTII,,, | Performed by: NURSE PRACTITIONER

## 2023-07-11 PROCEDURE — 82728 ASSAY OF FERRITIN: CPT | Performed by: NURSE PRACTITIONER

## 2023-07-11 PROCEDURE — 4010F ACE/ARB THERAPY RXD/TAKEN: CPT | Mod: CPTII,,, | Performed by: NURSE PRACTITIONER

## 2023-07-11 PROCEDURE — 99396 PREV VISIT EST AGE 40-64: CPT | Mod: ,,, | Performed by: NURSE PRACTITIONER

## 2023-07-11 PROCEDURE — 3008F PR BODY MASS INDEX (BMI) DOCUMENTED: ICD-10-PCS | Mod: CPTII,,, | Performed by: NURSE PRACTITIONER

## 2023-07-11 RX ORDER — CELECOXIB 200 MG/1
200 CAPSULE ORAL DAILY
Qty: 30 CAPSULE | Refills: 2 | Status: SHIPPED | OUTPATIENT
Start: 2023-07-11 | End: 2023-08-15

## 2023-07-11 NOTE — PROGRESS NOTES
Patient ID: Mihir Mccallum is a 53 y.o. male.    Chief Complaint: wellness                     History of Present Illness:  The patient is 53 y.o. White male for evaluation and management with a chief complaint of wellness and lab results.  Home blood pressure readings all less than 130/80.  Had recent carotid ultrasound with Cardiology that showed mild bilateral stenosis.  Recently received CPAP machine and feels like he is having some issues adjusting to it.    Normocytic anemia on recent labs without history of anemia.  Denies hematochezia.  History of alcohol abuse but has not drank in the past 3 years.  He also admits to polysubstance abuse that caused cardiomyopathy.    He continues to smoke 1 pack of cigarettes per day.  Daily smoker for the past 40 years.    He complains of generalized joint pain that is worse to bilateral shoulders and knees.  Some improvement with ibuprofen.  He is followed by Dr. Ferreira and repeat shoulder surgeries were discussed.  He is considering a referral to pain management.      Review of Systems   Constitutional:  Negative for activity change, appetite change, fatigue and fever.   HENT:  Negative for ear pain, hearing loss and trouble swallowing.    Eyes:  Negative for pain, redness and visual disturbance.   Respiratory:  Negative for cough, chest tightness and shortness of breath.    Cardiovascular:  Negative for chest pain, palpitations, leg swelling and claudication.   Gastrointestinal:  Negative for abdominal pain, blood in stool, change in bowel habit, constipation, diarrhea, nausea, vomiting and change in bowel habit.   Endocrine: Negative for cold intolerance, heat intolerance, polydipsia, polyphagia and polyuria.   Genitourinary:  Negative for dysuria, frequency and hematuria.   Musculoskeletal:  Positive for arthralgias and back pain. Negative for gait problem and joint swelling.   Integumentary:  Negative for rash, wound and mole/lesion.   Allergic/Immunologic:  "Negative for environmental allergies.   Neurological:  Negative for seizures, weakness, headaches and memory loss.   Hematological:  Negative for adenopathy. Does not bruise/bleed easily.   Psychiatric/Behavioral:  Positive for sleep disturbance. Negative for agitation and confusion. The patient is not nervous/anxious.        Past Medical History:  has a past medical history of CAD (coronary artery disease), Hyperlipidemia, Hypertension, and Sleep apnea, unspecified.    Current Outpatient Medications   Medication Instructions    aspirin (ECOTRIN) 81 mg, Oral, Daily    atorvastatin (LIPITOR) 40 mg, Oral, Nightly    celecoxib (CELEBREX) 200 mg, Oral, Daily    COREG 37.5 mg, Oral, 2 times daily    fluticasone propionate (FLONASE) 100 mcg, Each Nostril, Daily    lisinopriL (PRINIVIL,ZESTRIL) 40 mg, Oral    olopatadine (PATANOL) 0.1 % ophthalmic solution 1 drop, Both Eyes, 2 times daily       Patient is allergic to clindamycin and hydrocodone-acetaminophen.       Visit Vitals  /60 (BP Location: Right arm)   Pulse 72   Temp 96.8 °F (36 °C) (Temporal)   Ht 5' 7" (1.702 m)   Wt 86.5 kg (190 lb 12.8 oz)   SpO2 99%   BMI 29.88 kg/m²         Physical Exam  Constitutional:       General: He is not in acute distress.     Appearance: Normal appearance.   HENT:      Right Ear: Tympanic membrane, ear canal and external ear normal.      Left Ear: Tympanic membrane, ear canal and external ear normal.      Mouth/Throat:      Mouth: Mucous membranes are moist.   Eyes:      General: No scleral icterus.     Extraocular Movements: Extraocular movements intact.      Conjunctiva/sclera: Conjunctivae normal.      Pupils: Pupils are equal, round, and reactive to light.   Cardiovascular:      Rate and Rhythm: Normal rate and regular rhythm.      Pulses: Normal pulses.      Heart sounds: Normal heart sounds.   Pulmonary:      Effort: Pulmonary effort is normal. No respiratory distress.      Breath sounds: Normal breath sounds. "   Abdominal:      General: Abdomen is flat. Bowel sounds are normal.      Palpations: Abdomen is soft.      Tenderness: There is no abdominal tenderness.   Musculoskeletal:      Cervical back: Normal range of motion and neck supple. No tenderness.   Lymphadenopathy:      Cervical: No cervical adenopathy.   Skin:     General: Skin is warm and dry.   Neurological:      Mental Status: He is alert and oriented to person, place, and time. Mental status is at baseline.   Psychiatric:         Mood and Affect: Mood normal.         Thought Content: Thought content normal.         Judgment: Judgment normal.       Recent Labs Obtained:  No visits with results within 7 Day(s) from this visit.   Latest known visit with results is:   Office Visit on 06/27/2023   Component Date Value Ref Range Status    Sodium Level 06/27/2023 141  135 - 145 mmol/L Final    Potassium Level 06/27/2023 4.6  3.5 - 5.1 mmol/L Final    Chloride 06/27/2023 111 (H)  98 - 110 mmol/L Final    Carbon Dioxide 06/27/2023 24  21 - 32 mmol/L Final    Glucose Level 06/27/2023 100  70 - 115 mg/dL Final    Blood Urea Nitrogen 06/27/2023 19.0  7.0 - 20.0 mg/dL Final    Creatinine 06/27/2023 1.04  0.66 - 1.25 mg/dL Final    Calcium Level Total 06/27/2023 9.3  8.4 - 10.2 mg/dL Final    Protein Total 06/27/2023 6.8  6.3 - 8.2 gm/dL Final    Albumin Level 06/27/2023 4.6  3.4 - 5.0 g/dL Final    Globulin 06/27/2023 2.2  2.0 - 3.9 gm/dL Final    Albumin/Globulin Ratio 06/27/2023 2.1  ratio Final    Bilirubin Total 06/27/2023 0.4  0.0 - 1.0 mg/dL Final    Alkaline Phosphatase 06/27/2023 37 (L)  50 - 144 unit/L Final    Alanine Aminotransferase 06/27/2023 27  1 - 45 unit/L Final    Aspartate Aminotransferase 06/27/2023 26  17 - 59 unit/L Final    eGFR 06/27/2023 86  mls/min/1.73/m2 Final                         EGFR INTERPRETATION    Beginning 8/15/22 we are reporting the eGFRcr calculation as recommended by the [employer]. The eGFRcr equation has similar  overall performance characteristics to the older equation, but the values may differ by more than 10% particularly at higher values of eGFRcr and younger adult ages.    NKF stages of chronic kidney disease (CKD)  Stage 1: Kidney damage with normal or increased eGFR (>90 mL/min/1.73 m^2)  Stage 2: Mild reduction in GFR (60-89 mL/min/1.73 m^2)  Stage 3a: Moderate reduction in GFR (45-59 mL/min/1.73 m^2)  Stage 3b: Moderate reduction in GFR (30-44 mL/min/1.73 m^2)  Stage 4: Severe reduction in GFR (15-29 mL/min/1.73 m^2)  Stage 5: Kidney failure (GFR <15 mL/min/1.73 m^2)        Anion Gap 06/27/2023 6.0  2.0 - 13.0 mEq/L Final    BUN/Creatinine Ratio 06/27/2023 18  12 - 20 Final    Cholesterol Total 06/27/2023 128  0 - 200 mg/dL Final    HDL Cholesterol 06/27/2023 43  40 - 60 mg/dL Final    Triglyceride 06/27/2023 132  30 - 200 mg/dL Final    LDL Cholesterol Direct 06/27/2023 57.2  30.0 - 100.0 mg/dL Final    Hemoglobin A1c 06/27/2023 5.9  4.0 - 6.0 % Final    Estimated Average Glucose 06/27/2023 122.6 (H)  70.0 - 115.0 mg/dL Final    Thyroid Stimulating Hormone 06/27/2023 0.861  0.360 - 3.740 uIU/mL Final    Prostate Specific Antigen 06/27/2023 0.30  <=4.00 ng/mL Final    WBC 06/27/2023 8.96  4.00 - 11.50 x10(3)/mcL Final    RBC 06/27/2023 3.74 (L)  4.00 - 6.00 x10(6)/mcL Final    Hgb 06/27/2023 12.0 (L)  13.0 - 18.0 g/dL Final    Hct 06/27/2023 34.6 (L)  36.0 - 52.0 % Final    MCV 06/27/2023 92.5  79.0 - 99.0 fL Final    MCH 06/27/2023 32.1  27.0 - 34.0 pg Final    MCHC 06/27/2023 34.7  31.0 - 37.0 g/dL Final    RDW 06/27/2023 14.0  % Final    Platelet 06/27/2023 320  140 - 371 x10(3)/mcL Final    MPV 06/27/2023 9.7  9.4 - 12.4 fL Final    Neut % 06/27/2023 62.8  37 - 73 % Final    Lymph % 06/27/2023 23.2  20 - 55 % Final    Mono % 06/27/2023 8.9  4.7 - 12.5 % Final    Eos % 06/27/2023 4.4  0.7 - 7 % Final    Basophil % 06/27/2023 0.4  0.1 - 1.2 % Final    Lymph # 06/27/2023 2.08  1.32 - 3.57 x10(3)/mcL Final    Neut  # 06/27/2023 5.62 (H)  1.78 - 5.38 x10(3)/mcL Final    Mono # 06/27/2023 0.80  0.3 - 0.82 x10(3)/mcL Final    Eos # 06/27/2023 0.39  0.04 - 0.54 x10(3)/mcL Final    Baso # 06/27/2023 0.04  0.01 - 0.08 x10(3)/mcL Final    IG# 06/27/2023 0.03  0.0001 - 0.031 x10(3)/mcL Final    IG% 06/27/2023 0.3  0 - 0.5 % Final    NRBC% 06/27/2023 0.0  <=1 % Final         Assessment/Plan:    ICD-10-CM ICD-9-CM   1. Wellness examination  Z00.00 V70.0   2. Anemia, unspecified type  D64.9 285.9   3. Polyarthritis  M13.0 716.50   4. Nicotine dependence, cigarettes, uncomplicated  F17.210 305.1   5. Screening for lung cancer  Z12.2 V76.0   6. 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 Carotid stenosis, bilateral  I65.23 433.10     433.30   9. Obstructive sleep apnea  G47.33 327.23       1. Wellness examination  Discussed results of labs:  BUN 19, creatinine 1.04, sodium 141, potassium 4.6, AST 26, ALT 27  TSH 0.861   PSA 0.3    2. Anemia, unspecified type  Hemoglobin 12, hematocrit 34.6   Obtain iron, B12, folate, TIBC, ferritin levels  Scheduled for appointment with Dr. Beltrán to discuss colonoscopy    3. Polyarthritis  Discontinue ibuprofen and begin Celebrex 200 mg daily   Will discuss referral to pain management with orthopedic  - celecoxib (CELEBREX) 200 MG capsule; Take 1 capsule (200 mg total) by mouth once daily.  Dispense: 30 capsule; Refill: 2    4. Nicotine dependence, cigarettes, uncomplicated  Encouraged complete cessation   Obtain low-dose CT chest as screening for lung cancer  - CT Chest Low Dose Diagnostic; Future    5. Screening for lung cancer  - CT Chest Low Dose Diagnostic; Future    6. 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 Carotid stenosis, bilateral  1-39% on recent ultrasound   Visit note with Cardiology reviewed    9.  Obstructive sleep apnea  Educated on importance of CPAP use nightly    10. Hyperlipidemia   Continue atorvastatin 40 mg nightly   Total cholesterol 128, HDL 43, triglycerides 132, LDL 57     11. Prediabetes   Hemoglobin A1c 5.9   Discussed beginning metformin but will do trial of diet and exercise         Follow up in about 4 weeks (around 8/8/2023) for lab f/u. or sooner as needed.    Future Appointments   Date Time Provider Department Center   8/15/2023 11:15 AM NICOLE Sliva FNP

## 2023-07-12 LAB
HCV AB SERPL QL IA: NONREACTIVE
HIV 1+2 AB+HIV1 P24 AG SERPL QL IA: NONREACTIVE
IRON SATN MFR SERPL: 46 % (ref 20–50)
IRON SERPL-MCNC: 122 UG/DL (ref 65–175)
TIBC SERPL-MCNC: 144 UG/DL (ref 69–240)
TIBC SERPL-MCNC: 266 UG/DL (ref 250–450)
TRANSFERRIN SERPL-MCNC: 224 MG/DL (ref 174–364)

## 2023-07-24 ENCOUNTER — TELEPHONE (OUTPATIENT)
Dept: FAMILY MEDICINE | Facility: CLINIC | Age: 53
End: 2023-07-24
Payer: MEDICAID

## 2023-07-24 DIAGNOSIS — Z12.2 SCREENING FOR LUNG CANCER: Primary | ICD-10-CM

## 2023-07-27 ENCOUNTER — HOSPITAL ENCOUNTER (OUTPATIENT)
Dept: RADIOLOGY | Facility: HOSPITAL | Age: 53
Discharge: HOME | End: 2023-07-27
Attending: NURSE PRACTITIONER
Payer: MEDICAID

## 2023-07-27 DIAGNOSIS — Z12.2 SCREENING FOR LUNG CANCER: ICD-10-CM

## 2023-07-27 PROCEDURE — 71271 CT THORAX LUNG CANCER SCR C-: CPT | Mod: TC

## 2023-08-15 ENCOUNTER — OFFICE VISIT (OUTPATIENT)
Dept: FAMILY MEDICINE | Facility: CLINIC | Age: 53
End: 2023-08-15
Payer: MEDICAID

## 2023-08-15 VITALS
HEIGHT: 67 IN | DIASTOLIC BLOOD PRESSURE: 78 MMHG | OXYGEN SATURATION: 96 % | BODY MASS INDEX: 29.93 KG/M2 | SYSTOLIC BLOOD PRESSURE: 110 MMHG | TEMPERATURE: 98 F | WEIGHT: 190.69 LBS | HEART RATE: 70 BPM

## 2023-08-15 DIAGNOSIS — G62.9 POLYNEUROPATHY: Primary | ICD-10-CM

## 2023-08-15 DIAGNOSIS — F17.210 NICOTINE DEPENDENCE, CIGARETTES, UNCOMPLICATED: ICD-10-CM

## 2023-08-15 DIAGNOSIS — D64.9 ANEMIA, UNSPECIFIED TYPE: ICD-10-CM

## 2023-08-15 PROCEDURE — 3078F PR MOST RECENT DIASTOLIC BLOOD PRESSURE < 80 MM HG: ICD-10-PCS | Mod: CPTII,,, | Performed by: NURSE PRACTITIONER

## 2023-08-15 PROCEDURE — 4010F ACE/ARB THERAPY RXD/TAKEN: CPT | Mod: CPTII,,, | Performed by: NURSE PRACTITIONER

## 2023-08-15 PROCEDURE — 4010F PR ACE/ARB THEARPY RXD/TAKEN: ICD-10-PCS | Mod: CPTII,,, | Performed by: NURSE PRACTITIONER

## 2023-08-15 PROCEDURE — 1160F PR REVIEW ALL MEDS BY PRESCRIBER/CLIN PHARMACIST DOCUMENTED: ICD-10-PCS | Mod: CPTII,,, | Performed by: NURSE PRACTITIONER

## 2023-08-15 PROCEDURE — 99213 OFFICE O/P EST LOW 20 MIN: CPT | Mod: ,,, | Performed by: NURSE PRACTITIONER

## 2023-08-15 PROCEDURE — 99213 PR OFFICE/OUTPT VISIT, EST, LEVL III, 20-29 MIN: ICD-10-PCS | Mod: ,,, | Performed by: NURSE PRACTITIONER

## 2023-08-15 PROCEDURE — 3078F DIAST BP <80 MM HG: CPT | Mod: CPTII,,, | Performed by: NURSE PRACTITIONER

## 2023-08-15 PROCEDURE — 3008F PR BODY MASS INDEX (BMI) DOCUMENTED: ICD-10-PCS | Mod: CPTII,,, | Performed by: NURSE PRACTITIONER

## 2023-08-15 PROCEDURE — 3074F SYST BP LT 130 MM HG: CPT | Mod: CPTII,,, | Performed by: NURSE PRACTITIONER

## 2023-08-15 PROCEDURE — 1160F RVW MEDS BY RX/DR IN RCRD: CPT | Mod: CPTII,,, | Performed by: NURSE PRACTITIONER

## 2023-08-15 PROCEDURE — 3008F BODY MASS INDEX DOCD: CPT | Mod: CPTII,,, | Performed by: NURSE PRACTITIONER

## 2023-08-15 PROCEDURE — 3044F HG A1C LEVEL LT 7.0%: CPT | Mod: CPTII,,, | Performed by: NURSE PRACTITIONER

## 2023-08-15 PROCEDURE — 3074F PR MOST RECENT SYSTOLIC BLOOD PRESSURE < 130 MM HG: ICD-10-PCS | Mod: CPTII,,, | Performed by: NURSE PRACTITIONER

## 2023-08-15 PROCEDURE — 1159F MED LIST DOCD IN RCRD: CPT | Mod: CPTII,,, | Performed by: NURSE PRACTITIONER

## 2023-08-15 PROCEDURE — 1159F PR MEDICATION LIST DOCUMENTED IN MEDICAL RECORD: ICD-10-PCS | Mod: CPTII,,, | Performed by: NURSE PRACTITIONER

## 2023-08-15 PROCEDURE — 3044F PR MOST RECENT HEMOGLOBIN A1C LEVEL <7.0%: ICD-10-PCS | Mod: CPTII,,, | Performed by: NURSE PRACTITIONER

## 2023-08-15 RX ORDER — GABAPENTIN 100 MG/1
100 CAPSULE ORAL 2 TIMES DAILY
Qty: 60 CAPSULE | Refills: 0 | Status: SHIPPED | OUTPATIENT
Start: 2023-08-15 | End: 2023-08-24 | Stop reason: SDUPTHER

## 2023-08-15 NOTE — PROGRESS NOTES
"Patient ID: Mihir Mccallum is a 53 y.o. male.    Chief Complaint: Anemia (Follow up)                     History of Present Illness:  The patient is 53 y.o. White male for evaluation and management with a chief complaint of Anemia (Follow up) .  Mild anemia with hemoglobin 12 and hematocrit 34.6., B12, and folate within normal limits.  He had an appointment for colonoscopy but canceled and has not rescheduled.    Requesting to restart gabapentin.  He continues to experience chronic neck pain that radiates into bilateral shoulders and arms.  Pain is constant but much more pronounced when lying supine or with overuse.  He often experiences numbness and tingling to entire arms and fingers.  In addition, he continues to experience generalized joint pain. Followed by Dr. Ferreira.     He continues to smoke about 20 cigarettes per day.  Wellbutrin was ineffective and experience nausea with nicotine replacement.    Low-dose CT chest 07/27/2023:  Chronic changes.  No worrisome parenchymal masses/nodules or focal consolidation.    ROS: See HPI    Past Medical History:  has a past medical history of CAD (coronary artery disease), Hyperlipidemia, Hypertension, and Sleep apnea, unspecified.    Current Outpatient Medications   Medication Instructions    aspirin (ECOTRIN) 81 mg, Oral, Daily    atorvastatin (LIPITOR) 40 mg, Oral, Nightly    COREG 37.5 mg, Oral, 2 times daily    lisinopriL (PRINIVIL,ZESTRIL) 40 mg, Oral    olopatadine (PATANOL) 0.1 % ophthalmic solution 1 drop, Both Eyes, 2 times daily       Patient is allergic to clindamycin and hydrocodone-acetaminophen.       Visit Vitals  /78 (BP Location: Right arm, Patient Position: Sitting, BP Method: Medium (Manual))   Pulse 70   Temp 97.7 °F (36.5 °C) (Temporal)   Ht 5' 7" (1.702 m)   Wt 86.5 kg (190 lb 11.2 oz)   SpO2 96%   BMI 29.87 kg/m²         Physical Exam  Vitals reviewed.   Constitutional:       Appearance: Normal appearance.   Cardiovascular:      Rate and " Rhythm: Normal rate and regular rhythm.      Heart sounds: Normal heart sounds.   Pulmonary:      Effort: Pulmonary effort is normal. No respiratory distress.      Breath sounds: Normal breath sounds.   Musculoskeletal:      Cervical back: Neck supple. Tenderness present.   Lymphadenopathy:      Cervical: No cervical adenopathy.   Skin:     General: Skin is warm and dry.   Neurological:      Mental Status: He is alert and oriented to person, place, and time. Mental status is at baseline.   Psychiatric:         Mood and Affect: Mood normal.         Thought Content: Thought content normal.         Judgment: Judgment normal.         Recent Labs Obtained:  No visits with results within 7 Day(s) from this visit.   Latest known visit with results is:   Office Visit on 07/11/2023   Component Date Value Ref Range Status    HIV 07/11/2023 Nonreactive  Nonreactive Final    Hep C Ab Interp 07/11/2023 Nonreactive  Nonreactive Final    Iron Binding Capacity Unsaturated 07/11/2023 144  69 - 240 ug/dL Final    Iron Level 07/11/2023 122  65 - 175 ug/dL Final    Transferrin 07/11/2023 224  174 - 364 mg/dL Final    Iron Binding Capacity Total 07/11/2023 266  250 - 450 ug/dL Final    Iron Saturation 07/11/2023 46  20 - 50 % Final    Ferritin Level 07/11/2023 169  17.9 - 464 ng/mL Final    Vitamin B12 Level 07/11/2023 456  211 - 946 pg/mL Final    Folate Level 07/11/2023 >20.0 (H)  2.8 - 20.0 ng/mL Final         Assessment/Plan:    ICD-10-CM ICD-9-CM   1. Polyneuropathy  G62.9 356.9   2. Nicotine dependence, cigarettes, uncomplicated  F17.210 305.1   3. Anemia, unspecified type  D64.9 285.9       1. Polyneuropathy  Restart gabapentin at 100 mg b.i.d.  Continue follow-up with Orthopedic    2. Nicotine dependence, cigarettes, uncomplicated  Encourage complete cessation    3. Anemia, unspecified type  Encouraged to reschedule colonoscopy     Return to clinic in 1 month or sooner if needed    Future Appointments   Date Time Provider  Department Center   9/18/2023  1:30 PM Hailey Butler FNP [facility] Kelly Whitehead, FNP

## 2023-08-24 DIAGNOSIS — H10.10 ALLERGIC CONJUNCTIVITIS, UNSPECIFIED LATERALITY: ICD-10-CM

## 2023-08-24 DIAGNOSIS — E78.5 HYPERLIPIDEMIA, UNSPECIFIED HYPERLIPIDEMIA TYPE: ICD-10-CM

## 2023-08-24 DIAGNOSIS — G62.9 POLYNEUROPATHY: ICD-10-CM

## 2023-08-24 RX ORDER — GABAPENTIN 100 MG/1
100 CAPSULE ORAL 2 TIMES DAILY
Qty: 60 CAPSULE | Refills: 3 | Status: SHIPPED | OUTPATIENT
Start: 2023-08-24 | End: 2023-09-25

## 2023-08-24 RX ORDER — OLOPATADINE HYDROCHLORIDE 1 MG/ML
1 SOLUTION/ DROPS OPHTHALMIC 2 TIMES DAILY
Qty: 5 ML | Refills: 3 | Status: SHIPPED | OUTPATIENT
Start: 2023-08-24 | End: 2023-10-01

## 2023-08-24 RX ORDER — ATORVASTATIN CALCIUM 40 MG/1
40 TABLET, FILM COATED ORAL NIGHTLY
Qty: 90 TABLET | Refills: 3 | Status: SHIPPED | OUTPATIENT
Start: 2023-08-24 | End: 2024-08-23

## 2024-07-17 PROBLEM — M75.121 COMPLETE TEAR OF RIGHT ROTATOR CUFF: Status: ACTIVE | Noted: 2024-07-17

## 2025-02-26 PROBLEM — M75.21 BICEPS TENDINITIS OF RIGHT SHOULDER: Status: ACTIVE | Noted: 2025-02-26

## 2025-02-26 PROBLEM — T84.84XA PAINFUL ORTHOPAEDIC HARDWARE: Status: ACTIVE | Noted: 2025-02-26

## 2025-02-26 PROBLEM — S43.431A DEGENERATIVE SUPERIOR LABRAL ANTERIOR-TO-POSTERIOR (SLAP) TEAR OF RIGHT SHOULDER: Status: ACTIVE | Noted: 2025-02-26

## 2025-02-27 PROBLEM — M19.011 ARTHRITIS OF RIGHT ACROMIOCLAVICULAR JOINT: Status: ACTIVE | Noted: 2025-02-27

## 2025-03-24 PROBLEM — R33.9 INCOMPLETE BLADDER EMPTYING: Status: ACTIVE | Noted: 2025-03-24

## 2025-03-24 PROBLEM — R31.0 GROSS HEMATURIA: Status: ACTIVE | Noted: 2025-03-24

## 2025-04-17 ENCOUNTER — PATIENT OUTREACH (OUTPATIENT)
Dept: ADMINISTRATIVE | Facility: HOSPITAL | Age: 55
End: 2025-04-17

## 2025-05-28 ENCOUNTER — PATIENT MESSAGE (OUTPATIENT)
Dept: ADMINISTRATIVE | Facility: HOSPITAL | Age: 55
End: 2025-05-28
Payer: MEDICAID

## 2025-05-29 ENCOUNTER — PATIENT OUTREACH (OUTPATIENT)
Dept: ADMINISTRATIVE | Facility: HOSPITAL | Age: 55
End: 2025-05-29
Payer: MEDICAID

## 2025-08-02 ENCOUNTER — E-VISIT (OUTPATIENT)
Dept: URGENT CARE | Facility: CLINIC | Age: 55
End: 2025-08-02
Payer: MEDICAID

## 2025-08-02 DIAGNOSIS — Z76.0 ENCOUNTER FOR MEDICATION REFILL: Primary | ICD-10-CM

## 2025-08-02 DIAGNOSIS — M19.90 ARTHRITIS: ICD-10-CM

## 2025-08-02 RX ORDER — METHOCARBAMOL 500 MG/1
500 TABLET, FILM COATED ORAL 3 TIMES DAILY
Qty: 21 TABLET | Refills: 0 | Status: SHIPPED | OUTPATIENT
Start: 2025-08-02 | End: 2025-08-05 | Stop reason: DRUGHIGH

## 2025-08-02 RX ORDER — GABAPENTIN 600 MG/1
600 TABLET ORAL 3 TIMES DAILY
Qty: 90 TABLET | Refills: 0 | Status: SHIPPED | OUTPATIENT
Start: 2025-08-02 | End: 2025-08-05 | Stop reason: SDUPTHER

## 2025-08-02 NOTE — PROGRESS NOTES
Patient ID: Mihir Mccallum is a 55 y.o. male.        E-Visit Time Tracking:             Chief Complaint: General Illness (Entered automatically based on patient selection in [employer].) and Medication Refill      The patient initiated a request through [employer] on 8/2/2025 for evaluation and management with a chief complaint of General Illness (Entered automatically based on patient selection in [employer].) and Medication Refill     I evaluated the questionnaire submission on 08/02/2025.    [facility]-Medication    8/2/2025  8:43 AM CDT - Filed by Patient   What do you need help with? Medication Request   Do you agree to participate in an E-Visit? Yes   If you have any of the following symptoms, please present to your local emergency room or call 911:  I acknowledge   Medication requests for narcotics will not be addressed via an E-Visit.  Please schedule an appointment. I acknowledge   Do you want to address a new or existing medication? (Start a new medication, Address a current medication) Address a current medication   What is the main issue you would like addressed today? 2 medications i have requested for a week with no response   Would you like to change or continue your medication? (Change medication, Continue medication) Continue medication   What is the name of the medication you would like to continue? Gabapentin 600mg x three daily, Methocarbamol 750 mg x 4 daily   Are you taking your medication as prescribed? Yes   Which option below best describes the reason for your request? (Renew refills, Prior authorization is required) Renew refills    What medical condition is the  medication intended to treat? Muscle spasms and nerve pain in back   Has the medication helped your condition? (Yes, No, Not sure) Yes   Have you experienced any side effects from the medication? No   Provide any information you feel is important to your history not asked above I have new Cat Scan images for proof    Please attach any relevant images or files    Are you able to take your vitals? Yes   Systolic Blood Pressure 119   Diastolic Blood Pressure 76   Weight: 236   Height: 69   Pluse: 61   Temp 97.2   Resp:    SpO2          Encounter Diagnoses   Name Primary?    Encounter for medication refill Yes    Arthritis         No orders of the defined types were placed in this encounter.     Medications Ordered This Encounter   Medications    gabapentin (NEURONTIN) 600 MG tablet     Sig: Take 1 tablet (600 mg total) by mouth 3 (three) times daily.     Dispense:  90 tablet     Refill:  0    methocarbamoL (ROBAXIN) 500 MG Tab     Sig: Take 1 tablet (500 mg total) by mouth 3 (three) times daily. Can make drowsy, do not operate heavy machinery while taking for 7 days     Dispense:  21 tablet     Refill:  0        Follow up in about 2 days (around 8/4/2025), or if symptoms worsen or fail to improve.